# Patient Record
Sex: FEMALE | Race: WHITE | URBAN - METROPOLITAN AREA
[De-identification: names, ages, dates, MRNs, and addresses within clinical notes are randomized per-mention and may not be internally consistent; named-entity substitution may affect disease eponyms.]

---

## 2022-03-01 ENCOUNTER — OFFICE VISIT (OUTPATIENT)
Dept: URBAN - METROPOLITAN AREA CLINIC 27 | Facility: CLINIC | Age: 87
End: 2022-03-01
Payer: COMMERCIAL

## 2022-03-01 DIAGNOSIS — H40.051 OCULAR HYPERTENSION, RIGHT EYE: ICD-10-CM

## 2022-03-01 DIAGNOSIS — Z96.1 PRESENCE OF INTRAOCULAR LENS: ICD-10-CM

## 2022-03-01 DIAGNOSIS — S05.11XA CONTUSION OF EYEBALL AND ORBITAL TISSUES, RIGHT EYE, INITIAL ENCOUNTER: Primary | ICD-10-CM

## 2022-03-01 DIAGNOSIS — H21.01 HYPHEMA, RIGHT EYE: ICD-10-CM

## 2022-03-01 PROCEDURE — 92134 CPTRZ OPH DX IMG PST SGM RTA: CPT | Performed by: OPHTHALMOLOGY

## 2022-03-01 PROCEDURE — 99204 OFFICE O/P NEW MOD 45 MIN: CPT | Performed by: OPHTHALMOLOGY

## 2022-03-01 RX ORDER — BRIMONIDINE TARTRATE, TIMOLOL MALEATE 2; 5 MG/ML; MG/ML
SOLUTION/ DROPS OPHTHALMIC
Qty: 5 | Refills: 3 | Status: INACTIVE
Start: 2022-03-01 | End: 2022-03-01

## 2022-03-01 RX ORDER — LATANOPROST 50 UG/ML
0.005 % SOLUTION OPHTHALMIC
Qty: 5 | Refills: 3 | Status: ACTIVE
Start: 2022-03-01

## 2022-03-01 RX ORDER — ATROPINE SULFATE 10 MG/ML
1 % SOLUTION/ DROPS OPHTHALMIC
Qty: 5 | Refills: 3 | Status: ACTIVE
Start: 2022-03-01

## 2022-03-01 RX ORDER — PREDNISOLONE ACETATE 10 MG/ML
1 % SUSPENSION/ DROPS OPHTHALMIC
Qty: 5 | Refills: 4 | Status: ACTIVE
Start: 2022-03-01

## 2022-03-01 ASSESSMENT — INTRAOCULAR PRESSURE
OD: 41
OS: 12

## 2022-03-01 NOTE — IMPRESSION/PLAN
Impression: Contusion of eyeball and orbital tissues OD Plan: Pt relates a h/o a fall 4 days ago, followed by a painful right eye. On exam, there is a periorbital edema, [address], hyphema with elevated IOP. B-scan OD shows PVD with hyperechoic signal c/w VH, no RD noted. Heme likely 2/2 trauma, recommend conservative management to start. Cont Combigan TID. Start PF QID, Atropine BID, Xalatan qHS. Pt has Sulfa allergy, so low threshold for PPV if IOP remains uncontrolled. Avoid straining/rubbing eye. 

2-3 days

## 2022-03-05 ENCOUNTER — OFFICE VISIT (OUTPATIENT)
Dept: URBAN - METROPOLITAN AREA CLINIC 7 | Facility: CLINIC | Age: 87
End: 2022-03-05
Payer: COMMERCIAL

## 2022-03-05 DIAGNOSIS — H53.001 UNSPECIFIED AMBLYOPIA, RIGHT EYE: ICD-10-CM

## 2022-03-05 PROCEDURE — 99213 OFFICE O/P EST LOW 20 MIN: CPT | Performed by: OPHTHALMOLOGY

## 2022-03-05 ASSESSMENT — INTRAOCULAR PRESSURE
OS: 18
OD: 56

## 2022-03-05 NOTE — IMPRESSION/PLAN
Impression: Contusion of eyeball and orbital tissues OD Plan: Pain resolved, periocular edema much improved. Hyphema stable and IOP remains elevted. 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 Given NLP [facility], recommend conservative management. Cont Combigan TID, PF QID, Atropine BID, Xalatan qHS. Pt has Sulfa allergy, so low threshold for PPV IOP leads to a painful eye. Avoid straining/rubbing eye. 

10 days

## 2022-03-16 ENCOUNTER — OFFICE VISIT (OUTPATIENT)
Dept: URBAN - METROPOLITAN AREA CLINIC 27 | Facility: CLINIC | Age: 87
End: 2022-03-16
Payer: COMMERCIAL

## 2022-03-16 DIAGNOSIS — H43.11 VITREOUS HEMORRHAGE, RIGHT EYE: ICD-10-CM

## 2022-03-16 PROCEDURE — 76512 OPH US DX B-SCAN: CPT | Performed by: OPHTHALMOLOGY

## 2022-03-16 PROCEDURE — 92134 CPTRZ OPH DX IMG PST SGM RTA: CPT | Performed by: OPHTHALMOLOGY

## 2022-03-16 PROCEDURE — 99214 OFFICE O/P EST MOD 30 MIN: CPT | Performed by: OPHTHALMOLOGY

## 2022-03-16 ASSESSMENT — INTRAOCULAR PRESSURE
OD: 35
OS: 17

## 2022-03-16 NOTE — IMPRESSION/PLAN
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 Plan: The patient denies pain. Comfort measures. There is a hyphema, with no view to the posterior. B-scan from 3/15/22 demonstrated VH with no RD OD. Will d/c xalatan. The IOP is elevated, but the patient denies pain. Follow Return in 1 month for follow up, [facility]

## 2022-05-03 ENCOUNTER — OFFICE VISIT (OUTPATIENT)
Dept: URBAN - METROPOLITAN AREA CLINIC 41 | Facility: CLINIC | Age: 87
End: 2022-05-03
Payer: COMMERCIAL

## 2022-05-03 DIAGNOSIS — H43.11 VITREOUS HEMORRHAGE, RIGHT EYE: ICD-10-CM

## 2022-05-03 DIAGNOSIS — H40.89 OTHER SPECIFIED GLAUCOMA: ICD-10-CM

## 2022-05-03 DIAGNOSIS — H21.01 HYPHEMA, RIGHT EYE: ICD-10-CM

## 2022-05-03 PROCEDURE — 99213 OFFICE O/P EST LOW 20 MIN: CPT | Performed by: OPHTHALMOLOGY

## 2022-05-03 PROCEDURE — 92134 CPTRZ OPH DX IMG PST SGM RTA: CPT | Performed by: OPHTHALMOLOGY

## 2022-05-03 ASSESSMENT — INTRAOCULAR PRESSURE
OD: 42
OS: 19

## 2022-05-03 NOTE — IMPRESSION/PLAN
Impression: Contusion of eyeball and orbital tissues OD Plan: Pain resolved. Edema and hyphema resolved. 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 Given NLP [facility], recommend conservative management. Reduce Combigan and PF to BID each. Stop Atropine BID.  

2 weeks

## 2022-05-24 ENCOUNTER — OFFICE VISIT (OUTPATIENT)
Dept: URBAN - METROPOLITAN AREA CLINIC 27 | Facility: CLINIC | Age: 87
End: 2022-05-24
Payer: COMMERCIAL

## 2022-05-24 DIAGNOSIS — H53.001 UNSPECIFIED AMBLYOPIA, RIGHT EYE: ICD-10-CM

## 2022-05-24 DIAGNOSIS — S05.11XA CONTUSION OF EYEBALL AND ORBITAL TISSUES, RIGHT EYE, INITIAL ENCOUNTER: Primary | ICD-10-CM

## 2022-05-24 PROCEDURE — 92012 INTRM OPH EXAM EST PATIENT: CPT | Performed by: OPHTHALMOLOGY

## 2022-05-24 ASSESSMENT — INTRAOCULAR PRESSURE
OD: 36
OS: 18

## 2022-05-24 NOTE — IMPRESSION/PLAN
Impression: Contusion of eyeball and orbital tissues OD Plan: No pain. Resolved K edema and hyphema. 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 OK to stop PF and Combigan. Given NLP [facility], recommend comfort care. OK to return to excellent care of Dr Morena Andrea.  

PRN